# Patient Record
Sex: MALE | Race: WHITE | URBAN - METROPOLITAN AREA
[De-identification: names, ages, dates, MRNs, and addresses within clinical notes are randomized per-mention and may not be internally consistent; named-entity substitution may affect disease eponyms.]

---

## 2022-11-03 ENCOUNTER — TELEPHONE (OUTPATIENT)
Dept: GASTROENTEROLOGY | Age: 57
End: 2022-11-03

## 2022-11-04 DIAGNOSIS — Z12.11 COLON CANCER SCREENING: Primary | ICD-10-CM

## 2022-11-09 RX ORDER — BISACODYL 5 MG
20 TABLET, DELAYED RELEASE (ENTERIC COATED) ORAL ONCE
Qty: 4 TABLET | Refills: 0 | Status: SHIPPED | OUTPATIENT
Start: 2022-11-09 | End: 2022-11-09

## 2022-11-09 RX ORDER — POLYETHYLENE GLYCOL 3350 17 G/17G
238 POWDER, FOR SOLUTION ORAL ONCE
Qty: 238 G | Refills: 0 | Status: SHIPPED | OUTPATIENT
Start: 2022-11-09 | End: 2022-11-09

## 2022-12-21 ENCOUNTER — HOSPITAL ENCOUNTER (OUTPATIENT)
Dept: PREADMISSION TESTING | Age: 57
Discharge: HOME | End: 2022-12-25

## 2022-12-22 VITALS — BODY MASS INDEX: 30.1 KG/M2 | HEIGHT: 71 IN | WEIGHT: 215 LBS

## 2022-12-22 RX ORDER — CHLORAL HYDRATE 500 MG
CAPSULE ORAL
COMMUNITY

## 2022-12-22 RX ORDER — ROSUVASTATIN CALCIUM 10 MG/1
20 TABLET, COATED ORAL DAILY
COMMUNITY

## 2022-12-22 RX ORDER — LANOLIN ALCOHOL/MO/W.PET/CERES
250 CREAM (GRAM) TOPICAL NIGHTLY
COMMUNITY

## 2022-12-22 NOTE — PROGRESS NOTES
go to the short stay unit for preparation to be discharged. Only your one designated person is allowed to come to short stay for your discharge. Pt verbalizes understanding of surgery/day of surgery instructions.

## 2022-12-27 ENCOUNTER — ANESTHESIA EVENT (OUTPATIENT)
Dept: ENDOSCOPY | Age: 57
End: 2022-12-27
Payer: COMMERCIAL

## 2022-12-27 NOTE — PRE-PROCEDURE INSTRUCTIONS
No answer, left message ? Unable to leave message ? When were you told to arrive at hospital ?  -1000    Do you have a  ?-YES    Are you on any blood thinners ? -NONE                   If yes when did you stop taking ? Do you have your prep Rx filled and instruction ?  -YES    Nothing to eat the day before , only clear liquids. -INSTRUCTED    Are you experiencing any covid symptoms ? -NONE    Do you have any infections or rash we should be aware of ?-NONE      Do you have the Hibiclens soap to use the night before and the morning of surgery ?-N/A    Nothing to eat or drink after midnight, only a sip of water to take any medication instructed to take the night before. -INSTRUCTED    Wear comfortable clothing, leave any valuables at home, remove any jewelry and body piercing . -INSTRUCTED

## 2022-12-28 ENCOUNTER — HOSPITAL ENCOUNTER (OUTPATIENT)
Age: 57
Setting detail: OUTPATIENT SURGERY
Discharge: HOME | End: 2022-12-28
Attending: INTERNAL MEDICINE | Admitting: INTERNAL MEDICINE
Payer: COMMERCIAL

## 2022-12-28 ENCOUNTER — ANESTHESIA (OUTPATIENT)
Dept: ENDOSCOPY | Age: 57
End: 2022-12-28
Payer: COMMERCIAL

## 2022-12-28 VITALS
TEMPERATURE: 97.7 F | RESPIRATION RATE: 10 BRPM | SYSTOLIC BLOOD PRESSURE: 127 MMHG | DIASTOLIC BLOOD PRESSURE: 100 MMHG | BODY MASS INDEX: 30.1 KG/M2 | HEIGHT: 71 IN | WEIGHT: 215 LBS | HEART RATE: 70 BPM | OXYGEN SATURATION: 95 %

## 2022-12-28 DIAGNOSIS — Z12.11 COLON CANCER SCREENING: ICD-10-CM

## 2022-12-28 PROCEDURE — 7100000010 HC PHASE II RECOVERY - FIRST 15 MIN: Performed by: INTERNAL MEDICINE

## 2022-12-28 PROCEDURE — 3700000000 HC ANESTHESIA ATTENDED CARE: Performed by: INTERNAL MEDICINE

## 2022-12-28 PROCEDURE — 2580000003 HC RX 258: Performed by: ANESTHESIOLOGY

## 2022-12-28 PROCEDURE — 7100000011 HC PHASE II RECOVERY - ADDTL 15 MIN: Performed by: INTERNAL MEDICINE

## 2022-12-28 PROCEDURE — C1889 IMPLANT/INSERT DEVICE, NOC: HCPCS | Performed by: INTERNAL MEDICINE

## 2022-12-28 PROCEDURE — 6360000002 HC RX W HCPCS

## 2022-12-28 PROCEDURE — 2709999900 HC NON-CHARGEABLE SUPPLY: Performed by: INTERNAL MEDICINE

## 2022-12-28 PROCEDURE — 3609010600 HC COLONOSCOPY POLYPECTOMY SNARE/COLD BIOPSY: Performed by: INTERNAL MEDICINE

## 2022-12-28 PROCEDURE — 88305 TISSUE EXAM BY PATHOLOGIST: CPT

## 2022-12-28 PROCEDURE — 3700000001 HC ADD 15 MINUTES (ANESTHESIA): Performed by: INTERNAL MEDICINE

## 2022-12-28 PROCEDURE — 2500000003 HC RX 250 WO HCPCS

## 2022-12-28 RX ORDER — SODIUM CHLORIDE 0.9 % (FLUSH) 0.9 %
5-40 SYRINGE (ML) INJECTION EVERY 12 HOURS SCHEDULED
Status: DISCONTINUED | OUTPATIENT
Start: 2022-12-28 | End: 2022-12-28 | Stop reason: HOSPADM

## 2022-12-28 RX ORDER — SODIUM CHLORIDE 0.9 % (FLUSH) 0.9 %
5-40 SYRINGE (ML) INJECTION PRN
Status: DISCONTINUED | OUTPATIENT
Start: 2022-12-28 | End: 2022-12-28 | Stop reason: HOSPADM

## 2022-12-28 RX ORDER — SODIUM CHLORIDE, SODIUM LACTATE, POTASSIUM CHLORIDE, CALCIUM CHLORIDE 600; 310; 30; 20 MG/100ML; MG/100ML; MG/100ML; MG/100ML
INJECTION, SOLUTION INTRAVENOUS CONTINUOUS
Status: DISCONTINUED | OUTPATIENT
Start: 2022-12-28 | End: 2022-12-28 | Stop reason: HOSPADM

## 2022-12-28 RX ORDER — PROPOFOL 10 MG/ML
INJECTION, EMULSION INTRAVENOUS CONTINUOUS PRN
Status: DISCONTINUED | OUTPATIENT
Start: 2022-12-28 | End: 2022-12-28 | Stop reason: SDUPTHER

## 2022-12-28 RX ORDER — LIDOCAINE HYDROCHLORIDE 10 MG/ML
1 INJECTION, SOLUTION EPIDURAL; INFILTRATION; INTRACAUDAL; PERINEURAL
Status: DISCONTINUED | OUTPATIENT
Start: 2022-12-28 | End: 2022-12-28 | Stop reason: HOSPADM

## 2022-12-28 RX ORDER — SODIUM CHLORIDE 9 MG/ML
INJECTION, SOLUTION INTRAVENOUS PRN
Status: DISCONTINUED | OUTPATIENT
Start: 2022-12-28 | End: 2022-12-28 | Stop reason: HOSPADM

## 2022-12-28 RX ORDER — LIDOCAINE HYDROCHLORIDE 20 MG/ML
INJECTION, SOLUTION INFILTRATION; PERINEURAL PRN
Status: DISCONTINUED | OUTPATIENT
Start: 2022-12-28 | End: 2022-12-28 | Stop reason: SDUPTHER

## 2022-12-28 RX ADMIN — PROPOFOL 30 MG: 10 INJECTION, EMULSION INTRAVENOUS at 13:26

## 2022-12-28 RX ADMIN — PROPOFOL 20 MG: 10 INJECTION, EMULSION INTRAVENOUS at 12:41

## 2022-12-28 RX ADMIN — SODIUM CHLORIDE, POTASSIUM CHLORIDE, SODIUM LACTATE AND CALCIUM CHLORIDE: 600; 310; 30; 20 INJECTION, SOLUTION INTRAVENOUS at 10:52

## 2022-12-28 RX ADMIN — LIDOCAINE HYDROCHLORIDE 100 MG: 20 INJECTION, SOLUTION INFILTRATION; PERINEURAL at 12:37

## 2022-12-28 RX ADMIN — PROPOFOL 125 MCG/KG/MIN: 10 INJECTION, EMULSION INTRAVENOUS at 12:37

## 2022-12-28 RX ADMIN — PROPOFOL 50 MG: 10 INJECTION, EMULSION INTRAVENOUS at 12:39

## 2022-12-28 ASSESSMENT — ENCOUNTER SYMPTOMS
SORE THROAT: 0
SHORTNESS OF BREATH: 0
WHEEZING: 0
COUGH: 0
BACK PAIN: 0

## 2022-12-28 ASSESSMENT — PAIN - FUNCTIONAL ASSESSMENT: PAIN_FUNCTIONAL_ASSESSMENT: 0-10

## 2022-12-28 NOTE — DISCHARGE INSTRUCTIONS
Liquid diet today    If any pain, bleeding, fever, to call me or come to the emergency room    To see me in the office in the next 7-10 days    Colonoscopy: What to Expect at [address] [place] [address]  After you have a colonoscopy, you will stay at the clinic for 1 to 2 hours until the medicines wear off. Then you can go home, but you will need to arrange for a ride. Your doctor will tell you when you can eat and do your other usual activities. Your doctor will talk to you about when you will need your next colonoscopy. The results of your test and your risk for colorectal cancer will help your doctor decide how often you need to be checked. After the test, you may be bloated or have gas pains. You may need to pass gas. If a biopsy was done or a polyp was removed, you may have streaks of blood in your stool (feces) for a few days. This care sheet gives you a general idea about how long it will take for you to recover. But each person recovers at a different pace. Follow the steps below to get better as quickly as possible. How can you care for yourself at home? Activity  Rest as much as you need to after you go home. You should be able to go back to your usual activities the day after the test.  Diet  LIQUIDS TODAY IF NO BLEEDING OR PAIN , MAY EAT SOFT DIET TOMORROW  Follow your doctors directions for eating. Drink plenty of fluids (unless your doctor has told you not to) to replace the fluids that were lost during the colon prep. Do not drink alcohol. Medicines  If polyps were removed or a biopsy was done during the test, your doctor may tell you not to take aspirin or other anti-inflammatory medicines, such as ibuprofen (Advil, Motrin) and naproxen (Aleve), for a few days. Other instructions  For your safety, you should not drive or operate machinery until the medicine effects are gone and you can think clearly.  Your doctor may tell you not to drive or operate machinery until the day after your test.  Do not sign legal documents or make major decisions until the medicine effects are gone and you can think clearly. The anesthesia medicine can make it hard for you to fully understand what you are agreeing to. Follow-up care is a key part of your treatment and safety. Be sure to make and go to all appointments, and call your doctor if you are having problems. It's also a good idea to know your test results and keep a list of the medicines you take. Call your Doctor if you have any of the following:             Passing blood rectally or vomiting blood (it may be red or black). Persistent nausea or vomiting. Severe abdominal or chest pain, not relieved by passing gas. Fever of 100 or more, chills or excessive sweating. Redness or swelling at the IV site. If you experience shortness of breath or severe chest pain, call 911. Where can you learn more? Go to https://TypesafepeatOnePlace.com.Mobile Service Pros. org and sign in to your [employer] account. Enter E264 in the [employer] box to learn more about Colonoscopy: What to Expect at Home.     If you do not have an account, please click on the Sign Up Now link. © 3635-2365 Healthwise, Incorporated. Care instructions adapted under license by [facility]. This care instruction is for use with your licensed healthcare professional. If you have questions about a medical condition or this instruction, always ask your healthcare professional. [address] any warranty or liability for your use of this information. Content Version: 3.0.472581; Last Revised: February 20, 2013       High-Fiber Diet     What Is Fiber? Dietary fiber is a form of carbohydrate found in plants that cannot be digested by humans. All plants contain fiber, including fruits, vegetables, grains, and legumes. Fiber is often classified into two categories: soluble and insoluble.    Soluble fiber draws water into the bowel and can help slow digestion. Examples of foods that are high in soluble fiber include oatmeal, oat bran, barley, legumes (eg, beans and peas), apples, and strawberries. Insoluble fiber speeds digestion and can add bulk to the stool. Examples of foods that are high in insoluble fiber include whole-wheat products, wheat bran, cauliflower, green beans, and potatoes. Why Follow a High-Fiber Diet? A high-fiber diet is often recommended to prevent and treat constipation , hemorrhoids , diverticulitis , and irritable bowel syndrome . Eating a high-fiber diet can also help improve your cholesterol levels, lower your risk of coronary heart disease , reduce your risk of type 2 diabetes , and lower your weight. For people with type 1 or 2 diabetes, a high-fiber diet can also help stabilize blood sugar levels. How Much Fiber Should I Eat? A high-fiber diet should contain  20-35 grams  of fiber a day. This is actually the amount recommended for the general adult population; however, most Americans eat only 15 grams of fiber per day. Digestion of Fiber   Eating a higher fiber diet than usual can take some getting used to by your body's digestive system. To avoid the side effects of sudden increases in dietary fiber (eg, gas, cramping, bloating, and diarrhea), increase fiber gradually and be sure to drink plenty of fluids every day. Tips for Increasing Fiber Intake   Whenever possible, choose whole grains over refined grains (eg, brown rice instead of white rice, whole-wheat bread instead of white bread). Include a variety of grains in your diet, such as wheat, rye, barley, oats, quinoa, and bulgur. Eat more vegetarian-based meals. Here are some ideas: black bean burgers, eggplant lasagna, and veggie tofu stir-parks. Choose high-fiber snacks, such as fruits, popcorn, whole-grain crackers, and nuts. Make whole-grain cereal or whole-grain toast part of your daily breakfast regime.     When eating out, whether ordering a sandwich or dinner, ask for extra vegetables. When baking, replace part of the white flour with whole-wheat flour. Whole-wheat flour is particularly easy to incorporate into a recipe. High-Fiber Diet Eating Guide   Food Category   Foods Recommended   Notes   Grains   Whole-grain breads, muffins, bagels, or nazario bread Rye bread Whole-wheat crackers or crisp breads Whole-grain or bran cereals Oatmeal, oat bran, or grits Wheat germ Whole-wheat pasta and brown rice   Read the ingredients list on food labels. Look for products that list \"whole\" as the first ingredient (eg, whole-wheat, whole oats). Choose cereals with at least 2 grams of fiber per serving. Vegetables   All vegetables, especially asparagus, bean sprouts, broccoli, [place] sprouts, cabbage, carrots, cauliflower, celery, corn, greens, green beans, green pepper, onions, peas, potatoes (with skin), snow peas, spinach, squash, sweet potatoes, tomatoes, zucchini   For maximum fiber intake, eat the peels of fruits and vegetablesjust be sure to wash them well first.   Fruits   All fruits, especially apples, berries, grapefruits, mangoes, nectarines, oranges, peaches, pears, dried fruits (figs, dates, prunes, raisins)   Choose raw fruits and vegetables over juice, cooked, or cannedraw fruit has more fiber. Dried fruit is also a good source of fiber. Milk   With the exception of yogurt containing inulin (a type of fiber), dairy foods provide little fiber. Add more fiber by topping your yogurt or cottage cheese with fresh fruit, whole grain or bran cereals, nuts, or seeds.    Meats and Beans   All beans and peas, especially Garbanzo beans, kidney beans, lentils, lima beans, split peas, and lama beans All nuts and seeds, especially almonds, peanuts, [place] nuts, cashews, peanut butter, walnuts, sesame and sunflower seeds All meat, poultry, fish, and eggs   Increase fiber in meat dishes by adding lama beans, kidney beans, black-eyed peas, bran, or oatmeal. If you are following a low-fat diet, use nuts and seeds only in moderation. Fats and Oils   All in moderation   Fats and oils do not provide fiber   Snacks, Sweets, and Condiments   Fruit Nuts Popcorn, whole-wheat pretzels, or trail mix made with dried fruits, nuts, and seeds Cakes, breads, and cookies made with oatmeal or whole-wheat flour   Most snack foods do not provide much fiber. Choose snacks with at least 2 grams of fiber per serving. Last Reviewed: March 2011 Roney Bah MS, MPH, RD   Updated: 3/29/2011      Colon Polypectomy   (Colon Polyp Removal)       Definition   A colon polypectomy is the removal of polyps from the inside lining of the colon (large intestine). A polyp is a mass of tissue. Some types of polyps have the potential to develop into cancer. Most polyps can be removed during a colonoscopy or sigmoidoscopy . A Colon Polyp        2011 [address] [place] [address].   Reasons for Procedure   The purpose of the surgery is to remove a polyp. It is done for cancer prevention. In rare cases, larger polyps can cause troublesome symptoms, such as rectal bleeding, abdominal pain, and bowel irregularities. A polyp removal will relieve these symptoms. Possible Complications   Complications are rare, but no procedure is completely free of risk.  If you are planning to have a polypectomy, your doctor will review a list of possible complications, which may include:   Damage to the colon wall   Bleeding   Infection   Adverse reaction to the sedative   Factors that may increase the risk of complications include:   Type, size, and location of the polyp   Patient factors, such as blood-clotting disorders, substance abuse, or other diseases (eg, obesity , diabetes )   What to Expect   Prior to Procedure    Your doctor will likely do the following:   Physical exam and health history   Review of medicines   Test your stool for hidden blood (called \"occult blood\")   X-rays an exam that uses small amounts of radiation to make a picture of the inside of the body   Barium enema x-ray exam that uses contrast to help better see the colon   Diagnostic colonoscopy or sigmoidoscopyexamination of the inside of the intestine with an endoscope   Your colon must be completely cleaned before the procedure. Any stool left in the intestine will block the view. This preparation may start several days before the procedure. Follow your doctor's instructions, which may include any of the following cleansing methods:   Enemas fluid introduced into the rectum to stimulate a bowel movement   Laxativesmedicines that cause you to have soft bowel movements   A clear-liquid diet   Oral cathartic medicinesa large container of fluid to drink, which stimulates a bowel movement   Leading up to your procedure:   Talk to your doctor about your medicines. You may be asked to stop taking some medicines up to one week before the procedure, like:   Anti-inflammatory drugs (eg, aspirin)   Blood thinners, like clopidogrel (Plavix) or warfarin (Coumadin)   Iron supplements or vitamins containing iron. The night before, eat a light meal. Do not eat or drink anything after midnight. Wear comfortable clothing. If you have diabetes, ask your doctor if you need to adjust your insulin dose. Arrange for a ride home after the procedure. Anesthesia    You will receive a sedative. This will help you relax. You will be drowsy but awake. Description of the Procedure    You will be asked to lie on your side or on your back. A scope, a long flexible tube with a camera on the end, will be inserted through the anus. It will be slowly pushed through the rectum to the colon. The scope will also add air to open the colon. Using the scope, the doctor will locate the polyp. The polyp will be snipped off with a wire snare from the scope. In some cases, the polyp may be destroyed with an electric current.  The electric current is also used to close the wound and stop bleeding. The polyps will then be removed for lab testing. When the doctor is finished, the scope will be slowly removed. For larger polyps, a laparoscopic surgical procedure may be needed. Special surgical tools will be inserted through small incisions in the abdomen. The tools will be used to locate and remove the polyp. How Long Will It Take? 30-60 minutes   Will It Hurt? The special cleaning solution, laxatives, and/or enemas often cause discomfort. During and following the procedure, there is little or no pain. You may feel pressure, bloating, and/or cramping because of the air passed into the colon. This discomfort will go away with the passing of gas. Your doctor may prescribe pain medicine. If not, you can take non-prescription pain relievers for discomfort. Post-procedure Care   At the [facility]    The polyps will be sent to a lab for testing. At Home    Expect a complete recovery within two weeks. To ensure a smooth recovery, be sure to follow your doctor's instructions , which may include: The sedative will make you drowsy. Do not drive, operate machinery, or make important decisions the day of the procedure. Return to your normal diet the same or next day. Avoid tea, coffee, cola drinks, alcohol, and spicy foods for at least 2-3 days following surgery. These can irritate the digestive system. To speed healing, resume normal activities as soon as you feel able. Most people feel well enough by the next day. Ask your doctor when you can participate in any rigorous exercise. Ask your doctor about when it is safe to shower, bathe, or soak in water. You will be scheduled for a follow-up colonoscopy in the future. It will be important to check for recurrence of polyps. Your doctor will discuss the results with you either the day of surgery or the following day.    Call Your Doctor   After arriving home, contact your doctor if any of the following occurs:   Signs of infection, including fever and chills   Redness, swelling, increasing pain, excessive bleeding, or discharge from the rectum (Up to cup of blood per day can be expected for up to 3-4 days following your polypectomy.)   Black, tarry stools   Severe abdominal pain   Hard, swollen abdomen   Inability to pass gas or stool   Cough , shortness of breath, chest pain, or severe nausea or vomiting   New, unexplained symptoms   In case of emergency,  CALL 911  . Last Reviewed: December 2010 Yashira Moore MD   Updated: 4/7/2011      Sedation or General Anesthesia, Adult  Care After  Refer to this sheet in the next 24 hours. These instructions provide you with information on caring for yourself after your procedure. Your caregiver may also give you more specific instructions. Your treatment has been planned according to current medical practices, but problems sometimes occur. Call your caregiver if you have any problems or questions after your procedure. HOME CARE INSTRUCTIONS   Do not participate in any activities that require you to be alert or coordinated. Do not:  Drive. Swim. Ride a bicycle. Operate heavy machinery. [place]. Use power tools. Climb ladders. Work at [employer]. Take a bath. Do not drink alcohol. Do not make any important decisions or sign legal documents. Stay with an adult. The first meal following your procedure should be light and small. Avoid solid foods if you feel sick to your stomach (nauseous) or if you throw up (vomit). Drink enough fluids to keep your urine clear or pale yellow. Only take your usual medicines or new medicines if your caregiver approves them. Only take over-the-counter or prescription medicines for pain, discomfort, or fever as directed by your caregiver. Keep all follow-up appointments as directed by your caregiver. SEEK IMMEDIATE MEDICAL CARE IF:   You are not feeling normal or behaving normally after 24 hours. You have persistent nausea and vomiting.   You are unable to drink fluids or eat food. You have difficulty urinating. You have difficulty breathing or speaking. You have blue or gray skin. There is difficulty waking or you cannot be woken up. You have heavy bleeding, redness, or a lot of swelling where the sedative or anesthesia entered your skin (intravenous site). You have a rash. MAKE SURE YOU:  Understand these instructions. Will watch your condition. Will get help right away if you are not doing well or get worse. Document Released: 12/18/2006 Document Revised: 06/18/2013 Document Reviewed: 04/17/2013  [facility] Patient Information ©2013 [employer]. PATIENT INSTRUCTIONS  DIVERTICULOSIS    FOLLOW-UP:  Please make an appointment with your physician as directed. Call your physician immediately if you have any fevers greater than 102.5,  increasing abdominal pain, GI bleeding (from the colon or rectum),or nausea/vomiting. CAUSE:  Some people may have congenital diverticulosis, but most people develop diverticulosis around or after age 36 due to a low-fiber, high-fat diet, along with inadequate fluid intake. This is very prevalent in the industrialized world where we eat a lot of processed, low fiber foods. Diverticuli develop due to firm stool and high pressures in the colon, along with secondary spasm, which causes outpouchings to occus where the small arteries penetrate the wall of the colon to feed the internal lining. These occur most commonly on the left side and lower portions of the colon. Diverticuli can then cause bleeding from the arteries at these sites of weakness when they rupture or the diveritculi can get blocked with stool and debris and become obstructed, causing diverticulosis, which is due to an infection. When these are infected, diverticulitis, it is often treated with antibiotics and bowel rest, but when severe, recurrent, or if rupture of the colon occurs, it may require surgery.   Following appropriate dietary changes and taking the proper precautions is therefore very important. DIET:  You should increase your dietary fiber intake and take a fiber supplement twice a day. Make sure that you are taking a supplement that is just fiber and is not a laxative, which should be noted on the package. Starting a fiber supplement may cause increased gas, more frequent bowel movements, and distension at first but this should improve after a couple of weeks. Try to eat whole wheat breads and pasta, more fruits and vegetables, along with brown rice and plenty of fluids. Avoid small undigestible food items that could get stuck in these outpouchings, such as unpopped popcorn kernals, whole corn, small undigestible seeds, etc..    ACTIVITY:  Exercise is also a great way to prevent constipation and is encouraged. It may also help prevent progression of your diverticulosis. Always make sure you take in plenty of fluids when exercising. MEDICATIONS:  Take an over-the-counter fiber supplement as noted above twice daily. If your symptoms don't improve with fiber and dietary changes alone your physician may also recommend psyllium or methylcellulose as well. If your physician has placed you on an antibiotic it is critical that you take the full course of these, even if your symptoms have improved, and that you not miss any doses. QUESTIONS:  Please feel free to call your physician or the hospital  if you have any questions, and they will be glad to assist you. If you have further questions it may also be helpful to meet with a dietitician.

## 2022-12-28 NOTE — ANESTHESIA POSTPROCEDURE EVALUATION
POST- ANESTHESIA EVALUATION       Pt Name: Rylee Lopes  MRN: [id number]  [place]: 1965  Date of evaluation: 12/28/2022  Time:  3:00 PM      BP (!) 127/100   Pulse 70   Temp 97.7 °F (36.5 °C)   Resp 10   Ht 5' 11\" (1.803 m)   Wt 215 lb (97.5 kg)   SpO2 95%   BMI 29.99 kg/m²      Consciousness Level  Awake  Cardiopulmonary Status  Stable  Pain Adequately Treated YES  Nausea / Vomiting  NO  Adequate Hydration  YES  Anesthesia Related Complications NONE      Electronically signed by Kody Peterson MD on 12/28/2022 at 3:00 PM       Department of Anesthesiology  Postprocedure Note    Patient: Rylee Lopes  MRN: [id number]  YOB: 1965  Date of evaluation: 12/28/2022      Procedure Summary     Date: 12/28/22 Room / Location: [facility] 03 / [facility] ENDO    Anesthesia Start: 1233 Anesthesia Stop: 1512    Procedure: COLONOSCOPY POLYPECTOMY SNARE/COLD BIOPSY RECTAL CLIP APPLICATION X 1 Diagnosis:       Colon cancer screening      (COLON SCREENING)    Surgeons: Geraldo Andrea MD Responsible Provider: Kody Peterson MD    Anesthesia Type: general ASA Status: 2          Anesthesia Type: No value filed.     Louie Phase I:      Louie Phase II: Louie Score: 10      Anesthesia Post Evaluation

## 2022-12-28 NOTE — H&P
HISTORY and [facility] [address]       NAME:  Jj Funes  MRN: [id number]   YOB: 1965   Date: 12/28/2022   Age: 61 y.o. Gender: male       COMPLAINT AND PRESENT HISTORY:     Jj Funes is 61 y.o. male, having a screening colonoscopy. Pt has not had previous colonoscopy. Denies abdominal pain, heartburn, dysphagia, nausea, vomiting, diarrhea, constipation, bloody or tarry stools. Denies changes to appetite and unintended weight loss. Denies Family Hx of colon cancer. Completed and followed prescribed prep. NPO p MN. No medications taken this am. Denies taking anticoagulants or blood thinning medications. Denies recent or current chest pain/pressure, palpitations, SOB, recent URI, fever or chills. PAST MEDICAL HISTORY     Past Medical History:   Diagnosis Date    Hyperlipidemia        SURGICAL HISTORY       Past Surgical History:   Procedure Laterality Date    KNEE ARTHROSCOPY      TONSILLECTOMY         FAMILY HISTORY     History reviewed. No pertinent family history. SOCIAL HISTORY       Social History     Socioeconomic History    Marital status:      Spouse name: None    Number of children: None    Years of education: None    Highest education level: None   Tobacco Use    Smoking status: Never    Smokeless tobacco: Never   Vaping Use    Vaping Use: Never used   Substance and Sexual Activity    Alcohol use: Yes     Comment: 3-5 times per week    Drug use: Never        REVIEW OF SYSTEMS      No Known Allergies    No current facility-administered medications on file prior to encounter. No current outpatient medications on file prior to encounter. Notation: Above medications are not currently reconciled at time of signing this H&P note, to be reconciled in pre-op per RN. Review of Systems   Constitutional:  Negative for appetite change, chills, fever and unexpected weight change.    HENT:  Negative for congestion, dental problem, hearing loss and sore throat. Eyes:  Positive for visual disturbance (Glasses). Respiratory:  Negative for cough, shortness of breath and wheezing. Cardiovascular:  Negative for chest pain and palpitations. Gastrointestinal:         See HPI   Genitourinary: Negative. Musculoskeletal:  Negative for back pain and neck pain. Skin:  Negative for rash and wound. Neurological:  Negative for dizziness, speech difficulty, light-headedness and headaches. Hematological:  Does not bruise/bleed easily. Psychiatric/Behavioral: Negative. GENERAL PHYSICAL EXAM     Vitals: Review vitals per RN flowsheet. Physical Exam  Constitutional:       General: He is not in acute distress. Appearance: He is well-developed. He is not ill-appearing. HENT:      Head: Normocephalic and atraumatic. Nose: Nose normal.      Mouth/Throat:      Mouth: Mucous membranes are moist.      Pharynx: Oropharynx is clear. No oropharyngeal exudate or posterior oropharyngeal erythema. Eyes:      General: No scleral icterus. Right eye: No discharge. Left eye: No discharge. Pupils: Pupils are equal, round, and reactive to light. Neck:      Trachea: No tracheal deviation. Cardiovascular:      Rate and Rhythm: Normal rate and regular rhythm. Heart sounds: Normal heart sounds. No murmur heard. No friction rub. No gallop. Pulmonary:      Effort: Pulmonary effort is normal. No respiratory distress. Breath sounds: Normal breath sounds. No wheezing, rhonchi or rales. Abdominal:      General: Bowel sounds are normal. There is no distension. Palpations: Abdomen is soft. Tenderness: There is no abdominal tenderness. There is no guarding. Musculoskeletal:      Cervical back: Neck supple. Right lower leg: No edema. Left lower leg: No edema. Skin:     General: Skin is warm and dry. Coloration: Skin is not jaundiced.       Findings: No bruising, erythema or rash. Neurological:      General: No focal deficit present. Mental Status: He is alert and oriented to person, place, and time. Cranial Nerves: No cranial nerve deficit. Gait: Gait normal.   Psychiatric:         Mood and Affect: Mood normal.      PROVISIONAL DIAGNOSES / SURGERY:      COLORECTAL CANCER SCREENING, NOT HIGH RISK    COLON SCREENING    There are no problems to display for this patient.           Regina Gomez, CATALINO - CNP on 12/28/2022 at 10:23 AM

## 2022-12-28 NOTE — OP NOTE
COLONOSCOPY, prolonged procedure, took 1 hour which is about 30 minutes longer than usual time. DATE OF PROCEDURE: 12/28/2022    SURGEON: Eleanor Joya MD    ASSISTANT: None    PREOPERATIVE DIAGNOSIS: Screening colonoscopy    POSTOPERATIVE DIAGNOSIS: Diverticulosis, multiple polyps as described    OPERATION: Total colonoscopy snare polypectomy multiple polyps from upper right colon, sigmoid colon, rectum, excision of polyp with biopsy forceps from the lower right colon. ANESTHESIA: MAC    ESTIMATED BLOOD LOSS: None    COMPLICATIONS: None     SPECIMENS:  Was Obtained: Polyp low right colon, polyp upper right colon, polyp sigmoid colon, polyp rectum, probable lipoma rectum    HISTORY: The patient is a 61y.o. year old male with history of above preop diagnosis. I recommended colonoscopy with possible biopsy or polypectomy and I explained the risk, benefits, expected outcome, and alternatives to the procedure. Risks included but are not limited to bleeding, infection, respiratory distress, hypotension, and perforation of the colon and possibility of missing a lesion. The patient understands and is in agreement. PROCEDURE:  The patient's SPO2 remained above 90% throughout the procedure. Digital rectal exam was normal.  The colonoscope was inserted through the anus into the rectum and advanced under direct vision to the cecum without difficulty. The prep was fair. Findings:      Cecum/Ascending colon: abnormal: In the lower leg: About 2 inches above ileocecal valve, there is a flat polyp 3 to 4 mm seen, biopsied and excised  In the upper right colon there is a polyp which is about 3 to 5 mm, removed with cold snare  Patient has fair preparation  Transverse colon: normal    Descending/Sigmoid colon: abnormal: A polyp which is about 7 to 10 mm, in the sigmoid colon, removed with the snare. Unfortunately, this polyp was cut without applying cautery.   Subsequently as there was some oozing of blood, I did apply Endo Clip to polypectomy site to prevent future bleeding    Rectum/Anus: examined in normal and retroflexed positions and was abnormal: In the rectosigmoid area there is a polyp which is about 3 to 5 mm removed with cold snare. In the mid rectum there is a large lesion seen. This is about 2.5 cm. As this was causing partial obstruction, I did remove this with the snare and cautery technique. After removing this lesion, it was felt that it may be lipoma. Withdrawal Time was (minutes): 30    This is a prolonged procedure because of redundant colon and multiple polyps. There was some difficulty to advance the scope into the cecum because of redundant colon. The colon was decompressed and the scope was removed. The patient tolerated the procedure without unusual events. During the procedure, the patient's blood pressure, pulse and oxygen saturation remained stable and documented. No unusual events occurred during the procedure. Patient was transferred to recovery room and will be discharged when criteria is met. Recommendations/Plan:   F/U Biopsies  F/U In Office as instructed  Discussed with the family  High fiber diet   Precautions to avoid constipation     Next colonoscopy: 1 years.   If Colonoscopy is less than 10 years the recommended reason is due: Fair preparation, multiple polyps    Electronically signed by Chayito Rios MD  on 12/28/2022 at 1:37 PM

## 2022-12-28 NOTE — ANESTHESIA PRE PROCEDURE
Department of Anesthesiology  Preprocedure Note       Name:  Vasquez Maher   Age:  61 y.o.  :  1965                                          MRN:  [id number]         Date:  2022      Surgeon: Addis Pretty):  Los Hirsch MD    Procedure: Procedure(s):  COLORECTAL CANCER SCREENING, NOT HIGH RISK    Medications prior to admission:   Prior to Admission medications    Medication Sig Start Date End Date Taking? Authorizing Provider   niacin 250 MG extended release capsule Take 250 mg by mouth nightly    Historical Provider, MD   [place]-3 1000 MG CAPS Take by mouth    Historical Provider, MD   rosuvastatin (CRESTOR) 10 MG tablet Take 20 mg by mouth daily    Historical Provider, MD       Current medications:    Current Facility-Administered Medications   Medication Dose Route Frequency Provider Last Rate Last Admin    sodium chloride flush 0.9 % injection 5-40 mL  5-40 mL IntraVENous 2 times per day Christianne Huitron MD        sodium chloride flush 0.9 % injection 5-40 mL  5-40 mL IntraVENous PRN Kerwin Zepeda MD        0.9 % sodium chloride infusion   IntraVENous PRN Christianne Huitron MD        lactated ringers infusion   IntraVENous Continuous Christianne Huitron  mL/hr at 22 1052 New Bag at 22 1052    lidocaine PF 1 % injection 1 mL  1 mL IntraDERmal Once PRN Christianne Huitron MD           Allergies:  No Known Allergies    Problem List:  There is no problem list on file for this patient.       Past Medical History:        Diagnosis Date    Hyperlipidemia        Past Surgical History:        Procedure Laterality Date    KNEE ARTHROSCOPY      TONSILLECTOMY         Social History:    Social History     Tobacco Use    Smoking status: Never    Smokeless tobacco: Never   Substance Use Topics    Alcohol use: Yes     Comment: 3-5 times per week                                Counseling given: Not Answered      Vital Signs (Current):   Vitals:    22 1028   BP: 128/82   Pulse: 73   Resp: 18 Temp: 96.8 °F (36 °C)   TempSrc: Infrared   SpO2: 97%   Weight: 215 lb (97.5 kg)   Height: 5' 11\" (1.803 m)                                              BP Readings from Last 3 Encounters:   12/28/22 128/82       NPO Status: Time of last liquid consumption: 2200                        Time of last solid consumption: 2000                        Date of last liquid consumption: 12/27/22                        Date of last solid food consumption: 12/26/22    BMI:   Wt Readings from Last 3 Encounters:   12/28/22 215 lb (97.5 kg)   12/22/22 215 lb (97.5 kg)     Body mass index is 29.99 kg/m². CBC:   Lab Results   Component Value Date/Time    WBC 6.5 08/30/2013 07:55 AM    RBC 5.16 08/30/2013 07:55 AM    HGB 15.4 08/30/2013 07:55 AM    HCT 46.9 08/30/2013 07:55 AM    MCV 90.9 08/30/2013 07:55 AM    RDW 13.5 08/30/2013 07:55 AM     08/30/2013 07:55 AM       CMP:   Lab Results   Component Value Date/Time     04/20/2012 06:58 AM    K 4.5 04/20/2012 06:58 AM     04/20/2012 06:58 AM    CO2 30 04/20/2012 06:58 AM    BUN 22 04/20/2012 06:58 AM    CREATININE 1.20 04/20/2012 06:58 AM    GFRAA >60 04/20/2012 06:58 AM    LABGLOM >60 04/20/2012 06:58 AM    GLUCOSE 106 04/20/2012 06:58 AM    CALCIUM 9.7 04/20/2012 06:58 AM    BILITOT 0.44 06/15/2012 06:26 AM    ALKPHOS 72 06/15/2012 06:26 AM    AST 20 06/15/2012 06:26 AM    ALT 14 06/15/2012 06:26 AM       POC Tests: No results for input(s): POCGLU, POCNA, POCK, POCCL, POCBUN, POCHEMO, POCHCT in the last 72 hours.     Coags: No results found for: PROTIME, INR, APTT    HCG (If Applicable): No results found for: PREGTESTUR, PREGSERUM, HCG, HCGQUANT     ABGs: No results found for: PHART, PO2ART, FZD7ZFC, ZUE5AKA, BEART, [id number]     Type & Screen (If Applicable):  No results found for: LABABO, LABRH    Drug/Infectious Status (If Applicable):  No results found for: HIV, HEPCAB    COVID-19 Screening (If Applicable): No results found for: COVID19        Anesthesia Evaluation  Patient summary reviewed and Nursing notes reviewed no history of anesthetic complications:   Airway: Mallampati: II  TM distance: >3 FB   Neck ROM: full  Mouth opening: > = 3 FB   Dental: normal exam         Pulmonary:Negative Pulmonary ROS and normal exam  breath sounds clear to auscultation                             Cardiovascular:    (+) hyperlipidemia        Rhythm: regular  Rate: normal                    Neuro/Psych:   Negative Neuro/Psych ROS              GI/Hepatic/Renal: Neg GI/Hepatic/Renal ROS            Endo/Other: Negative Endo/Other ROS                    Abdominal:             Vascular: Other Findings:           Anesthesia Plan      general     ASA 2     (Tiva)  Induction: intravenous. MIPS: Prophylactic antiemetics administered. Anesthetic plan and risks discussed with patient. Plan discussed with CRNA.                     Julia Lam MD   12/28/2022

## 2022-12-30 LAB — SURGICAL PATHOLOGY REPORT: NORMAL

## 2023-01-13 ENCOUNTER — TELEPHONE (OUTPATIENT)
Dept: GASTROENTEROLOGY | Age: 58
End: 2023-01-13

## 2023-01-13 DIAGNOSIS — Z12.11 COLON CANCER SCREENING: ICD-10-CM

## 2023-01-13 NOTE — TELEPHONE ENCOUNTER
Patient called for his Colonoscopy results. Writer went over the result with patient and let him know he needs to repeat it in 1 year. Recall put in.

## 2025-04-17 ENCOUNTER — HOSPITAL ENCOUNTER (EMERGENCY)
Age: 60
Discharge: HOME | End: 2025-04-18
Attending: EMERGENCY MEDICINE
Payer: COMMERCIAL

## 2025-04-17 ENCOUNTER — APPOINTMENT (OUTPATIENT)
Dept: GENERAL RADIOLOGY | Age: 60
End: 2025-04-17
Payer: COMMERCIAL

## 2025-04-17 DIAGNOSIS — S46.911A STRAIN OF RIGHT SHOULDER, INITIAL ENCOUNTER: Primary | ICD-10-CM

## 2025-04-17 LAB
ALBUMIN SERPL-MCNC: 4.7 G/DL (ref 3.5–5.2)
ALP SERPL-CCNC: 116 U/L (ref 40–129)
ALT SERPL-CCNC: 100 U/L (ref 10–50)
ANION GAP SERPL CALCULATED.3IONS-SCNC: 12 MMOL/L (ref 9–16)
AST SERPL-CCNC: 35 U/L (ref 10–50)
BASOPHILS # BLD: 0.1 K/UL (ref 0–0.2)
BASOPHILS NFR BLD: 1 % (ref 0–2)
BILIRUB SERPL-MCNC: 0.4 MG/DL (ref 0–1.2)
BUN SERPL-MCNC: 17 MG/DL (ref 8–23)
CALCIUM SERPL-MCNC: 10 MG/DL (ref 8.6–10.4)
CHLORIDE SERPL-SCNC: 99 MMOL/L (ref 98–107)
CO2 SERPL-SCNC: 26 MMOL/L (ref 20–31)
CREAT SERPL-MCNC: 1.3 MG/DL (ref 0.7–1.2)
CRP SERPL HS-MCNC: 6.8 MG/L (ref 0–5)
EOSINOPHIL # BLD: 0 K/UL (ref 0–0.4)
EOSINOPHILS RELATIVE PERCENT: 0 % (ref 0–4)
ERYTHROCYTE [DISTWIDTH] IN BLOOD BY AUTOMATED COUNT: 14.1 % (ref 11.5–14.9)
ERYTHROCYTE [SEDIMENTATION RATE] IN BLOOD BY PHOTOMETRIC METHOD: 13 MM/HR (ref 0–20)
GFR, ESTIMATED: 63 ML/MIN/1.73M2
GLUCOSE SERPL-MCNC: 234 MG/DL (ref 74–99)
HCT VFR BLD AUTO: 44.5 % (ref 41–53)
HGB BLD-MCNC: 14.8 G/DL (ref 13.5–17.5)
LYMPHOCYTES NFR BLD: 1.3 K/UL (ref 1–4.8)
LYMPHOCYTES RELATIVE PERCENT: 11 % (ref 24–44)
MCH RBC QN AUTO: 29.3 PG (ref 26–34)
MCHC RBC AUTO-ENTMCNC: 33.3 G/DL (ref 31–37)
MCV RBC AUTO: 87.8 FL (ref 80–100)
MONOCYTES NFR BLD: 1 K/UL (ref 0.1–1.3)
MONOCYTES NFR BLD: 9 % (ref 1–7)
NEUTROPHILS NFR BLD: 79 % (ref 36–66)
NEUTS SEG NFR BLD: 9.4 K/UL (ref 1.3–9.1)
PLATELET # BLD AUTO: 317 K/UL (ref 150–450)
PMV BLD AUTO: 9.7 FL (ref 6–12)
POTASSIUM SERPL-SCNC: 4.3 MMOL/L (ref 3.7–5.3)
PROT SERPL-MCNC: 7.7 G/DL (ref 6.6–8.7)
RBC # BLD AUTO: 5.07 M/UL (ref 4.5–5.9)
SODIUM SERPL-SCNC: 137 MMOL/L (ref 136–145)
TROPONIN I SERPL HS-MCNC: <6 NG/L (ref 0–22)
WBC OTHER # BLD: 11.8 K/UL (ref 3.5–11)

## 2025-04-17 PROCEDURE — 84484 ASSAY OF TROPONIN QUANT: CPT

## 2025-04-17 PROCEDURE — 96372 THER/PROPH/DIAG INJ SC/IM: CPT

## 2025-04-17 PROCEDURE — 36415 COLL VENOUS BLD VENIPUNCTURE: CPT

## 2025-04-17 PROCEDURE — 80053 COMPREHEN METABOLIC PANEL: CPT

## 2025-04-17 PROCEDURE — 85652 RBC SED RATE AUTOMATED: CPT

## 2025-04-17 PROCEDURE — 86140 C-REACTIVE PROTEIN: CPT

## 2025-04-17 PROCEDURE — 73030 X-RAY EXAM OF SHOULDER: CPT

## 2025-04-17 PROCEDURE — 85025 COMPLETE CBC W/AUTO DIFF WBC: CPT

## 2025-04-17 PROCEDURE — 93005 ELECTROCARDIOGRAM TRACING: CPT | Performed by: EMERGENCY MEDICINE

## 2025-04-17 PROCEDURE — 96374 THER/PROPH/DIAG INJ IV PUSH: CPT

## 2025-04-17 PROCEDURE — 6360000002 HC RX W HCPCS: Performed by: EMERGENCY MEDICINE

## 2025-04-17 PROCEDURE — 99285 EMERGENCY DEPT VISIT HI MDM: CPT

## 2025-04-17 RX ORDER — KETOROLAC TROMETHAMINE 30 MG/ML
15 INJECTION, SOLUTION INTRAMUSCULAR; INTRAVENOUS ONCE
Status: COMPLETED | OUTPATIENT
Start: 2025-04-17 | End: 2025-04-17

## 2025-04-17 RX ORDER — ORPHENADRINE CITRATE 30 MG/ML
60 INJECTION INTRAMUSCULAR; INTRAVENOUS ONCE
Status: COMPLETED | OUTPATIENT
Start: 2025-04-17 | End: 2025-04-17

## 2025-04-17 RX ADMIN — ORPHENADRINE CITRATE 60 MG: 30 INJECTION, SOLUTION INTRAMUSCULAR; INTRAVENOUS at 23:30

## 2025-04-17 RX ADMIN — KETOROLAC TROMETHAMINE 15 MG: 30 INJECTION, SOLUTION INTRAMUSCULAR at 23:30

## 2025-04-17 ASSESSMENT — LIFESTYLE VARIABLES
HOW OFTEN DO YOU HAVE A DRINK CONTAINING ALCOHOL: MONTHLY OR LESS
HOW MANY STANDARD DRINKS CONTAINING ALCOHOL DO YOU HAVE ON A TYPICAL DAY: 1 OR 2

## 2025-04-17 ASSESSMENT — PAIN SCALES - GENERAL: PAINLEVEL_OUTOF10: 8

## 2025-04-17 ASSESSMENT — PAIN - FUNCTIONAL ASSESSMENT: PAIN_FUNCTIONAL_ASSESSMENT: 0-10

## 2025-04-17 ASSESSMENT — PAIN DESCRIPTION - LOCATION: LOCATION: SHOULDER

## 2025-04-17 ASSESSMENT — PAIN DESCRIPTION - ORIENTATION: ORIENTATION: RIGHT

## 2025-04-18 VITALS
DIASTOLIC BLOOD PRESSURE: 88 MMHG | HEART RATE: 94 BPM | WEIGHT: 220 LBS | SYSTOLIC BLOOD PRESSURE: 148 MMHG | HEIGHT: 71 IN | RESPIRATION RATE: 20 BRPM | OXYGEN SATURATION: 91 % | BODY MASS INDEX: 30.8 KG/M2 | TEMPERATURE: 98 F

## 2025-04-18 LAB — TROPONIN I SERPL HS-MCNC: <6 NG/L (ref 0–22)

## 2025-04-18 PROCEDURE — 84484 ASSAY OF TROPONIN QUANT: CPT

## 2025-04-18 PROCEDURE — 36415 COLL VENOUS BLD VENIPUNCTURE: CPT

## 2025-04-18 RX ORDER — CYCLOBENZAPRINE HCL 10 MG
10 TABLET ORAL 3 TIMES DAILY PRN
Qty: 30 TABLET | Refills: 0 | Status: SHIPPED | OUTPATIENT
Start: 2025-04-18 | End: 2025-04-28

## 2025-04-18 RX ORDER — METHYLPREDNISOLONE 4 MG/1
TABLET ORAL
Qty: 1 KIT | Refills: 0 | Status: SHIPPED | OUTPATIENT
Start: 2025-04-18

## 2025-04-18 RX ORDER — IBUPROFEN 600 MG/1
600 TABLET, FILM COATED ORAL EVERY 6 HOURS PRN
Qty: 60 TABLET | Refills: 0 | Status: SHIPPED | OUTPATIENT
Start: 2025-04-18

## 2025-04-18 RX ORDER — ACETAMINOPHEN 500 MG
1000 TABLET ORAL EVERY 8 HOURS PRN
Qty: 60 TABLET | Refills: 0 | Status: SHIPPED | OUTPATIENT
Start: 2025-04-18

## 2025-04-18 NOTE — ED PROVIDER NOTES
pounds, to the wood burner at his home.  He does not recall any particular injury though.    I think he may have rotator cuff pathology in his right shoulder.  I ordered a referral for outpatient shoulder specialist Dr. Hart.  If the patient has persistent pain next week I think he will benefit from evaluation and possible outpatient MRI to assess for rotator cuff pathology.  I will initiate some anti-inflammatories, steroids, Tylenol, ibuprofen, Flexeril.  He does not have any strenuous activities planned over the weekend.  Discussed with him to avoid slinging his shoulder in order to avoid frozen shoulder.  He agrees discharge plan.  He is given return precautions, anticipatory guidance, discharge instructions.    DATA FOR LAB AND RADIOLOGY TESTS ORDERED BELOW ARE REVIEWED BY THE ED CLINICIAN:    RADIOLOGY: All x-rays, CT, MRI, and formal ultrasound images (except ED bedside ultrasound) are read by the radiologist, see reports below, unless otherwise noted in MDM or here.  Reports below are reviewed by myself.  XR SHOULDER RIGHT (MIN 2 VIEWS)   Final Result   No acute fracture or dislocation at the right shoulder.      There are mild arthritic changes at the acromioclavicular joint.             LABS: Lab orders shown below, the results are reviewed by myself, and all abnormals are listed below.  Labs Reviewed   CBC WITH AUTO DIFFERENTIAL - Abnormal; Notable for the following components:       Result Value    WBC 11.8 (*)     Neutrophils % 79 (*)     Lymphocytes % 11 (*)     Monocytes % 9 (*)     Neutrophils Absolute 9.40 (*)     All other components within normal limits   COMPREHENSIVE METABOLIC PANEL - Abnormal; Notable for the following components:    Glucose 234 (*)     Creatinine 1.3 (*)      (*)     All other components within normal limits   C-REACTIVE PROTEIN - Abnormal; Notable for the following components:    CRP 6.8 (*)     All other components within normal limits   SEDIMENTATION RATE   TROPONIN    TROPONIN       Vitals Reviewed:    Vitals:    04/17/25 2301 04/18/25 0030 04/18/25 0100   BP: (!) 151/93 (!) 147/92 (!) 148/88   Pulse: 99 95 94   Resp: 20  20   Temp: 98 °F (36.7 °C)     TempSrc: Oral     SpO2: 97% 92% 91%   Weight: 99.8 kg (220 lb)     Height: 1.803 m (5' 11\")       MEDICATIONS GIVEN TO PATIENT THIS ENCOUNTER:  Orders Placed This Encounter   Medications    ketorolac (TORADOL) injection 15 mg    orphenadrine (NORFLEX) injection 60 mg    acetaminophen (TYLENOL) 500 MG tablet     Sig: Take 2 tablets by mouth every 8 hours as needed for Pain     Dispense:  60 tablet     Refill:  0    methylPREDNISolone (MEDROL, DAMARIS,) 4 MG tablet     Sig: Take by mouth.     Dispense:  1 kit     Refill:  0    cyclobenzaprine (FLEXERIL) 10 MG tablet     Sig: Take 1 tablet by mouth 3 times daily as needed for Muscle spasms     Dispense:  30 tablet     Refill:  0    ibuprofen (IBU) 600 MG tablet     Sig: Take 1 tablet by mouth every 6 hours as needed for Pain     Dispense:  60 tablet     Refill:  0     DISCHARGE PRESCRIPTIONS:  New Prescriptions    ACETAMINOPHEN (TYLENOL) 500 MG TABLET    Take 2 tablets by mouth every 8 hours as needed for Pain    CYCLOBENZAPRINE (FLEXERIL) 10 MG TABLET    Take 1 tablet by mouth 3 times daily as needed for Muscle spasms    IBUPROFEN (IBU) 600 MG TABLET    Take 1 tablet by mouth every 6 hours as needed for Pain    METHYLPREDNISOLONE (MEDROL, DAMARIS,) 4 MG TABLET    Take by mouth.     PHYSICIAN CONSULTS ORDERED THIS ENCOUNTER:  None  FINAL IMPRESSION      1. Strain of right shoulder, initial encounter          DISPOSITION/PLAN   DISPOSITION Decision To Discharge 04/18/2025 01:24:27 AM   DISPOSITION CONDITION Stable           OUTPATIENT FOLLOW UP THE PATIENT:  Danish Coleman MD  [address] [facility] [address]  [phone]    Schedule an appointment as soon as possible for a visit in 1 day      Jhonny Hart MD  [address]  [address]

## 2025-04-19 LAB
EKG ATRIAL RATE: 104 BPM
EKG P AXIS: 40 DEGREES
EKG P-R INTERVAL: 178 MS
EKG Q-T INTERVAL: 348 MS
EKG QRS DURATION: 78 MS
EKG QTC CALCULATION (BAZETT): 457 MS
EKG R AXIS: 33 DEGREES
EKG T AXIS: 14 DEGREES
EKG VENTRICULAR RATE: 104 BPM

## (undated) DEVICE — ENDO KIT W/SYRINGE: Brand: MEDLINE INDUSTRIES, INC.

## (undated) DEVICE — SNARE ENDOSCP L240CM OD2.4MM LOOP W25MM RND INSUL STIFF

## (undated) DEVICE — SNARE ENDOSCP POLYP MED STD AD 2.4X27X240 CM 2.8 MM OVL SENS

## (undated) DEVICE — POLYP TRAP: Brand: TRAPEASE®

## (undated) DEVICE — GLOVE ORANGE PI 7   MSG9070

## (undated) DEVICE — DEFENDO AIR WATER SUCTION AND BIOPSY VALVE KIT FOR  OLYMPUS: Brand: DEFENDO AIR/WATER/SUCTION AND BIOPSY VALVE

## (undated) DEVICE — FORCEPS BX L240CM WRK CHN 2.8MM STD CAP W/ NDL MIC MESH

## (undated) DEVICE — CLIP LIG L235CM RESOL 360 BX/20